# Patient Record
Sex: FEMALE | Race: ASIAN | NOT HISPANIC OR LATINO | Employment: FULL TIME | URBAN - METROPOLITAN AREA
[De-identification: names, ages, dates, MRNs, and addresses within clinical notes are randomized per-mention and may not be internally consistent; named-entity substitution may affect disease eponyms.]

---

## 2018-01-08 ENCOUNTER — OFFICE VISIT (OUTPATIENT)
Dept: INTERNAL MEDICINE | Facility: CLINIC | Age: 18
End: 2018-01-08

## 2018-01-08 VITALS
SYSTOLIC BLOOD PRESSURE: 108 MMHG | OXYGEN SATURATION: 98 % | HEIGHT: 66 IN | DIASTOLIC BLOOD PRESSURE: 66 MMHG | BODY MASS INDEX: 26.84 KG/M2 | WEIGHT: 167 LBS | HEART RATE: 95 BPM

## 2018-01-08 DIAGNOSIS — L30.9 ECZEMA, UNSPECIFIED TYPE: ICD-10-CM

## 2018-01-08 DIAGNOSIS — D50.8 OTHER IRON DEFICIENCY ANEMIA: ICD-10-CM

## 2018-01-08 DIAGNOSIS — Z00.00 ANNUAL PHYSICAL EXAM: Primary | ICD-10-CM

## 2018-01-08 PROCEDURE — 99385 PREV VISIT NEW AGE 18-39: CPT | Performed by: INTERNAL MEDICINE

## 2018-01-08 RX ORDER — MULTIPLE VITAMINS W/ MINERALS TAB 9MG-400MCG
1 TAB ORAL DAILY
COMMUNITY
End: 2018-09-05

## 2018-01-08 RX ORDER — FERROUS SULFATE 325(65) MG
325 TABLET ORAL
COMMUNITY

## 2018-01-08 RX ORDER — NORETHINDRONE ACETATE AND ETHINYL ESTRADIOL 1; 5 MG/1; UG/1
1 TABLET ORAL DAILY
COMMUNITY
End: 2018-01-29

## 2018-01-08 NOTE — PROGRESS NOTES
Chief Complaint   Patient presents with   • Establish Care       History of Present Illness  HM, Adult Female: The patient is being seen for a health maintenance and gynecology evaluation. The last health maintenance visit was 1 year(s) ago.   Social History: She is single. Work status:full time student.  She has never smoked. She reports never drinking alcohol. Denies any illicit drug use.  General Health: The patient's health is described as good. She has regular dental visits. She denies vision problems. She denies hearing loss. Immunizations status: up to date.   Lifestyle:. She consumes a diverse and healthy diet. She does not have any weight concerns. She exercises regularly. She denies tobacco. She denies alcohol use. She denies drug use.   Reproductive health:. She reports normal menses. On OBC due to menorrhagia for appx 3 years.  Screening: Cancer screening reviewed and current.   Metabolic screening reviewed and current.   Risk screening reviewed and current.   Has had some reflux, and eczema.  H/o menorrhagia, and had US.was wnl.  Went to  to have labs done-letter indicates, tsh was borderline high, and hemoglobin was wnl.    Review of Systems   Constitutional: Negative.  Negative for chills and fever.   HENT: Negative for ear discharge, ear pain, sinus pressure and sore throat.    Eyes: Negative for photophobia, pain and redness.   Respiratory: Negative for cough, chest tightness and shortness of breath.    Cardiovascular: Negative for chest pain, palpitations and leg swelling.   Gastrointestinal: Negative for diarrhea, nausea and vomiting.   Endocrine: Negative for polydipsia and polyphagia.   Genitourinary: Negative for frequency and urgency.   Musculoskeletal: Negative for arthralgias, back pain and myalgias.   Neurological: Negative for dizziness, syncope and headaches.   Psychiatric/Behavioral: Negative for confusion and sleep disturbance.       There is no problem list on file for this  "patient.      Social History     Social History   • Marital status: Single     Spouse name: N/A   • Number of children: N/A   • Years of education: N/A     Occupational History   • Not on file.     Social History Main Topics   • Smoking status: Never Smoker   • Smokeless tobacco: Never Used   • Alcohol use No   • Drug use: No   • Sexual activity: No     Other Topics Concern   • Not on file     Social History Narrative   • No narrative on file       No current outpatient prescriptions on file prior to visit.     No current facility-administered medications on file prior to visit.        No Known Allergies    /66  Pulse 95  Ht 167.6 cm (66\")  Wt 75.8 kg (167 lb)  SpO2 98%  BMI 26.95 kg/m2           The following portions of the patient's history were reviewed and updated as appropriate: allergies, current medications, past family history, past medical history, past social history, past surgical history and problem list.    Physical Exam   Constitutional: She is oriented to person, place, and time. She appears well-developed and well-nourished.   HENT:   Head: Normocephalic and atraumatic.   Right Ear: External ear normal.   Left Ear: External ear normal.   Mouth/Throat: No oropharyngeal exudate.   Eyes: Conjunctivae are normal. Pupils are equal, round, and reactive to light.   Neck: Neck supple. No thyromegaly present.   Cardiovascular: Normal rate, regular rhythm and intact distal pulses.    Pulmonary/Chest: Effort normal and breath sounds normal.   Abdominal: Soft. Bowel sounds are normal. She exhibits no distension. There is no tenderness.   Musculoskeletal: She exhibits no edema.   Neurological: She is alert and oriented to person, place, and time. No cranial nerve deficit.   Skin: Skin is warm and dry.   Psychiatric: She has a normal mood and affect. Judgment normal.   Nursing note and vitals reviewed.      Results for orders placed or performed in visit on 01/08/18   Comprehensive Metabolic Panel "   Result Value Ref Range    Glucose 86 70 - 100 mg/dL    BUN 10 9 - 23 mg/dL    Creatinine 0.60 0.60 - 1.30 mg/dL    Sodium 137 132 - 146 mmol/L    Potassium 4.6 3.5 - 5.5 mmol/L    Chloride 102 99 - 109 mmol/L    CO2 25.0 20.0 - 31.0 mmol/L    Calcium 9.5 8.7 - 10.4 mg/dL    Total Protein 6.7 5.7 - 8.2 g/dL    Albumin 4.00 3.20 - 4.80 g/dL    ALT (SGPT) 14 7 - 40 U/L    AST (SGOT) 18 0 - 33 U/L    Alkaline Phosphatase 88 25 - 100 U/L    Total Bilirubin 0.4 0.3 - 1.2 mg/dL    eGFR Non African Amer 129 >60 mL/min/1.73    eGFR  African Amer >150 >60 mL/min/1.73    Globulin 2.7 gm/dL    A/G Ratio 1.5 1.5 - 2.5 g/dL    BUN/Creatinine Ratio 16.7 7.0 - 25.0    Anion Gap 10.0 3.0 - 11.0 mmol/L   Lipid Panel   Result Value Ref Range    Total Cholesterol 175 0 - 200 mg/dL    Triglycerides 126 0 - 150 mg/dL    HDL Cholesterol 69 (H) 40 - 60 mg/dL    LDL Cholesterol  102 0 - 130 mg/dL   TSH   Result Value Ref Range    TSH 0.748 0.350 - 5.350 mIU/mL   T4, Free   Result Value Ref Range    Free T4 1.04 0.89 - 1.76 ng/dL   Vitamin D 25 Hydroxy   Result Value Ref Range    25 Hydroxy, Vitamin D 5.8 ng/ml       Vani was seen today for establish care.    Diagnoses and all orders for this visit:    Annual physical exam  -     Comprehensive Metabolic Panel  -     Lipid Panel  -     TSH  -     T4, Free  -     Vitamin D 25 Hydroxy    Other iron deficiency anemia    Eczema, unspecified type  Comments:  well controlled on OTC meds.          Health Maintenance   Topic Date Due   • TDAP/TD VACCINES (2 - Td) 11/01/2023   • INFLUENZA VACCINE  Completed       Discussion/Summary  Impression: health maintenance visit. Currently, she eats an adequate diet and has an adequate exercise regimen.   Cervical cancer screening:Pap smear is not indicated, start at 21.   Breast cancer screening: mammogram is not indicated.   Colorectal cancer screening: colonoscopy is not indicate.  Osteoporosis screening: Bone mineral density test is not indicate.    Screening lab work includes hemoglobin, glucose, lipid profile, thyroid function testing, 25-hydroxyvitamin D and urinalysis.   Immunizations are needed, immunizations will be given as outlined in the orders     Return in about 1 year (around 1/8/2019) for Annual physical.

## 2018-01-09 LAB
25(OH)D3 SERPL-MCNC: 5.8 NG/ML
ALBUMIN SERPL-MCNC: 4 G/DL (ref 3.2–4.8)
ALBUMIN/GLOB SERPL: 1.5 G/DL (ref 1.5–2.5)
ALP SERPL-CCNC: 88 U/L (ref 25–100)
ALT SERPL W P-5'-P-CCNC: 14 U/L (ref 7–40)
ANION GAP SERPL CALCULATED.3IONS-SCNC: 10 MMOL/L (ref 3–11)
ARTICHOKE IGE QN: 102 MG/DL (ref 0–130)
AST SERPL-CCNC: 18 U/L (ref 0–33)
BILIRUB SERPL-MCNC: 0.4 MG/DL (ref 0.3–1.2)
BUN BLD-MCNC: 10 MG/DL (ref 9–23)
BUN/CREAT SERPL: 16.7 (ref 7–25)
CALCIUM SPEC-SCNC: 9.5 MG/DL (ref 8.7–10.4)
CHLORIDE SERPL-SCNC: 102 MMOL/L (ref 99–109)
CHOLEST SERPL-MCNC: 175 MG/DL (ref 0–200)
CO2 SERPL-SCNC: 25 MMOL/L (ref 20–31)
CREAT BLD-MCNC: 0.6 MG/DL (ref 0.6–1.3)
GFR SERPL CREATININE-BSD FRML MDRD: 129 ML/MIN/1.73
GFR SERPL CREATININE-BSD FRML MDRD: >150 ML/MIN/1.73
GLOBULIN UR ELPH-MCNC: 2.7 GM/DL
GLUCOSE BLD-MCNC: 86 MG/DL (ref 70–100)
HDLC SERPL-MCNC: 69 MG/DL (ref 40–60)
POTASSIUM BLD-SCNC: 4.6 MMOL/L (ref 3.5–5.5)
PROT SERPL-MCNC: 6.7 G/DL (ref 5.7–8.2)
SODIUM BLD-SCNC: 137 MMOL/L (ref 132–146)
T4 FREE SERPL-MCNC: 1.04 NG/DL (ref 0.89–1.76)
TRIGL SERPL-MCNC: 126 MG/DL (ref 0–150)
TSH SERPL DL<=0.05 MIU/L-ACNC: 0.75 MIU/ML (ref 0.35–5.35)

## 2018-01-09 PROCEDURE — 84439 ASSAY OF FREE THYROXINE: CPT | Performed by: INTERNAL MEDICINE

## 2018-01-09 PROCEDURE — 84443 ASSAY THYROID STIM HORMONE: CPT | Performed by: INTERNAL MEDICINE

## 2018-01-09 PROCEDURE — 80053 COMPREHEN METABOLIC PANEL: CPT | Performed by: INTERNAL MEDICINE

## 2018-01-09 PROCEDURE — 80061 LIPID PANEL: CPT | Performed by: INTERNAL MEDICINE

## 2018-01-09 PROCEDURE — 82306 VITAMIN D 25 HYDROXY: CPT | Performed by: INTERNAL MEDICINE

## 2018-01-29 ENCOUNTER — OFFICE VISIT (OUTPATIENT)
Dept: INTERNAL MEDICINE | Facility: CLINIC | Age: 18
End: 2018-01-29

## 2018-01-29 VITALS
TEMPERATURE: 100.2 F | DIASTOLIC BLOOD PRESSURE: 68 MMHG | BODY MASS INDEX: 26.84 KG/M2 | HEART RATE: 127 BPM | HEIGHT: 66 IN | SYSTOLIC BLOOD PRESSURE: 132 MMHG | OXYGEN SATURATION: 99 % | WEIGHT: 167 LBS

## 2018-01-29 DIAGNOSIS — J11.1 FLU: Primary | ICD-10-CM

## 2018-01-29 DIAGNOSIS — J02.9 SORE THROAT: ICD-10-CM

## 2018-01-29 LAB
EXPIRATION DATE: ABNORMAL
EXPIRATION DATE: NORMAL
FLUAV AG NPH QL: NEGATIVE
FLUBV AG NPH QL: POSITIVE
INTERNAL CONTROL: ABNORMAL
INTERNAL CONTROL: NORMAL
Lab: ABNORMAL
Lab: NORMAL
S PYO AG THROAT QL: NEGATIVE

## 2018-01-29 PROCEDURE — 87804 INFLUENZA ASSAY W/OPTIC: CPT | Performed by: PHYSICIAN ASSISTANT

## 2018-01-29 PROCEDURE — 99213 OFFICE O/P EST LOW 20 MIN: CPT | Performed by: PHYSICIAN ASSISTANT

## 2018-01-29 PROCEDURE — 87880 STREP A ASSAY W/OPTIC: CPT | Performed by: PHYSICIAN ASSISTANT

## 2018-01-29 RX ORDER — NORGESTIMATE AND ETHINYL ESTRADIOL
KIT
COMMUNITY
Start: 2018-01-10

## 2018-01-29 RX ORDER — OSELTAMIVIR PHOSPHATE 75 MG/1
75 CAPSULE ORAL 2 TIMES DAILY
Qty: 10 CAPSULE | Refills: 1 | Status: SHIPPED | OUTPATIENT
Start: 2018-01-29 | End: 2018-09-05

## 2018-01-29 NOTE — PROGRESS NOTES
Subjective   Vani Eldridge is a 17 y.o. female  Sore Throat (Red, irritated, scratchy tonsils. Tea and cough drops help. ); Cough (Has been coughing to where getting nauseous ); Fever; and Generalized Body Aches      History of Present Illness  Patient is a pleasant 17-year-old female comes in complaining of body aches fever chills mild sore throat pressure both right and left ears cough which is nonproductive she's been taking over-the-counter Robitussin with some relief, patient's pain with swallowing mild sore throat no nausea no vomiting.  Rapid strep screen was negative rapid flu swab was positive for type B flu  The following portions of the patient's history were reviewed and updated as appropriate: allergies, current medications, past social history and problem list    Review of Systems   Constitutional: Positive for fatigue and fever. Negative for unexpected weight change.   Respiratory: Positive for cough and wheezing. Negative for chest tightness and shortness of breath.    Cardiovascular: Negative for chest pain, palpitations and leg swelling.   Gastrointestinal: Negative for nausea.   Skin: Negative for color change and rash.   Neurological: Negative for dizziness, syncope, weakness and headaches.       Objective     Vitals:    01/29/18 1807   BP: 132/68   Pulse: (!) 127   Temp: 100.2 °F (37.9 °C)   SpO2: 99%       Physical Exam   Constitutional: She appears well-developed and well-nourished.   HENT:   Head: Normocephalic and atraumatic.   Right Ear: Tympanic membrane and ear canal normal.   Left Ear: Tympanic membrane and ear canal normal.   Nose: Mucosal edema, rhinorrhea and sinus tenderness present. Right sinus exhibits maxillary sinus tenderness and frontal sinus tenderness. Left sinus exhibits maxillary sinus tenderness and frontal sinus tenderness.   Mouth/Throat: Oropharynx is clear and moist. No oropharyngeal exudate.   Eyes: Pupils are equal, round, and reactive to light.   Cardiovascular:  Normal rate and regular rhythm.    Pulmonary/Chest: Effort normal and breath sounds normal.   Nursing note and vitals reviewed.      Assessment/Plan     Diagnoses and all orders for this visit:    Flu  -     POCT Influenza A/B  -     oseltamivir (TAMIFLU) 75 MG capsule; Take 1 capsule by mouth 2 (Two) Times a Day.    Sore throat  -     POCT rapid strep A    Other orders  -     TRI-PREVIFEM 0.18/0.215/0.25 MG-35 MCG per tablet;     , Motrin for fever increase fluids

## 2018-02-05 ENCOUNTER — TELEPHONE (OUTPATIENT)
Dept: INTERNAL MEDICINE | Facility: CLINIC | Age: 18
End: 2018-02-05

## 2018-02-05 NOTE — TELEPHONE ENCOUNTER
Ok to extend for 4 more days.  Please print letter with release date for Next Monday.2/12/18    thanks

## 2018-02-05 NOTE — TELEPHONE ENCOUNTER
PATIENT'S FATHER IS REQUESTING AN EXTENDED WORK/SCHOOL EXCUSE FOR 4 MORE DAYS, HE REPORTS THAT THE PATIENT  IS STILL COUGHING. SHE WAS DX WITH FLU AT THIS OFFICE ON 01/29/2018    CALL BACK [phone]

## 2018-09-05 ENCOUNTER — OFFICE VISIT (OUTPATIENT)
Dept: INTERNAL MEDICINE | Facility: CLINIC | Age: 18
End: 2018-09-05

## 2018-09-05 VITALS
TEMPERATURE: 98.7 F | SYSTOLIC BLOOD PRESSURE: 126 MMHG | DIASTOLIC BLOOD PRESSURE: 78 MMHG | OXYGEN SATURATION: 99 % | WEIGHT: 160 LBS | HEIGHT: 66 IN | HEART RATE: 82 BPM | BODY MASS INDEX: 25.71 KG/M2

## 2018-09-05 DIAGNOSIS — J02.9 SORE THROAT: Primary | ICD-10-CM

## 2018-09-05 DIAGNOSIS — R52 BODY ACHES: ICD-10-CM

## 2018-09-05 LAB
EXPIRATION DATE: NORMAL
EXPIRATION DATE: NORMAL
FLUAV AG NPH QL: NEGATIVE
FLUBV AG NPH QL: NEGATIVE
INTERNAL CONTROL: NORMAL
INTERNAL CONTROL: NORMAL
Lab: NORMAL
Lab: NORMAL
S PYO AG THROAT QL: NEGATIVE

## 2018-09-05 PROCEDURE — 87804 INFLUENZA ASSAY W/OPTIC: CPT | Performed by: NURSE PRACTITIONER

## 2018-09-05 PROCEDURE — 87880 STREP A ASSAY W/OPTIC: CPT | Performed by: NURSE PRACTITIONER

## 2018-09-05 PROCEDURE — 99213 OFFICE O/P EST LOW 20 MIN: CPT | Performed by: NURSE PRACTITIONER

## 2018-09-05 RX ORDER — FLUTICASONE PROPIONATE 50 MCG
2 SPRAY, SUSPENSION (ML) NASAL DAILY
Qty: 1 BOTTLE | Refills: 1 | Status: SHIPPED | OUTPATIENT
Start: 2018-09-05 | End: 2018-12-17 | Stop reason: SDUPTHER

## 2018-09-06 NOTE — PATIENT INSTRUCTIONS
Strep and flu swabs were negative.  Continue Zyrtec.  Sitting and prescription for Flonase.Drink plenty of fluids.  Tylenol for pain.  Robitussin DM for cough unless otherwise directed.  Call on Monday if not steadily improving.  Sore Throat  When you have a sore throat, your throat may:  · Hurt.  · Burn.  · Feel irritated.  · Feel scratchy.    Many things can cause a sore throat, including:  · An infection.  · Allergies.  · Dryness in the air.  · Smoke or pollution.  · Gastroesophageal reflux disease (GERD).  · A tumor.    A sore throat can be the first sign of another sickness. It can happen with other problems, like coughing or a fever. Most sore throats go away without treatment.  Follow these instructions at home:  · Take over-the-counter medicines only as told by your doctor.  · Drink enough fluids to keep your pee (urine) clear or pale yellow.  · Rest when you feel you need to.  · To help with pain, try:  ? Sipping warm liquids, such as broth, herbal tea, or warm water.  ? Eating or drinking cold or frozen liquids, such as frozen ice pops.  ? Gargling with a salt-water mixture 3-4 times a day or as needed. To make a salt-water mixture, add ½-1 tsp of salt in 1 cup of warm water. Mix it until you cannot see the salt anymore.  ? Sucking on hard candy or throat lozenges.  ? Putting a cool-mist humidifier in your bedroom at night.  ? Sitting in the bathroom with the door closed for 5-10 minutes while you run hot water in the shower.  · Do not use any tobacco products, such as cigarettes, chewing tobacco, and e-cigarettes. If you need help quitting, ask your doctor.  Contact a doctor if:  · You have a fever for more than 2-3 days.  · You keep having symptoms for more than 2-3 days.  · Your throat does not get better in 7 days.  · You have a fever and your symptoms suddenly get worse.  Get help right away if:  · You have trouble breathing.  · You cannot swallow fluids, soft foods, or your saliva.  · You have  swelling in your throat or neck that gets worse.  · You keep feeling like you are going to throw up (vomit).  · You keep throwing up.  This information is not intended to replace advice given to you by your health care provider. Make sure you discuss any questions you have with your health care provider.  Document Released: 09/26/2009 Document Revised: 08/13/2017 Document Reviewed: 10/07/2016  Elsevier Interactive Patient Education © 2018 [employer]

## 2018-09-10 ENCOUNTER — TELEPHONE (OUTPATIENT)
Dept: INTERNAL MEDICINE | Facility: CLINIC | Age: 18
End: 2018-09-10

## 2018-09-10 DIAGNOSIS — J02.9 PHARYNGITIS, UNSPECIFIED ETIOLOGY: Primary | ICD-10-CM

## 2018-09-10 RX ORDER — AZITHROMYCIN 250 MG/1
TABLET, FILM COATED ORAL
Qty: 6 TABLET | Refills: 0 | Status: SHIPPED | OUTPATIENT
Start: 2018-09-10 | End: 2019-01-11

## 2018-09-10 RX ORDER — AMOXICILLIN 500 MG/1
500 CAPSULE ORAL 3 TIMES DAILY
Qty: 30 CAPSULE | Refills: 0 | Status: SHIPPED | OUTPATIENT
Start: 2018-09-10 | End: 2018-09-10 | Stop reason: ALTCHOICE

## 2018-09-10 NOTE — TELEPHONE ENCOUNTER
Please call pt  Why can't she can Amoxi?  It's not listed on her chart as an allergy.  I sent in a Zpack.

## 2018-09-10 NOTE — TELEPHONE ENCOUNTER
PATIENT SAID SHE WAS TOLD BY KARIN TO CALL BACK IF SHE WAS NOT FEELING BETTER SINCE LAST VISIT. SHE WAS SEEN ON 9/5 AND IS STILL NOT FEELING WELL. SHE WOULD LIKE SOMETHING CALLED IN IF POSSIBLE. SHE CANNOT TAKE AUGMENTIN OR AMOXICILLIN AND SHE WOULD PREFER A PROBIOTIC. PLEASE ADVISE. THANKS.

## 2018-10-29 RX ORDER — NORGESTIMATE AND ETHINYL ESTRADIOL
KIT
Qty: 28 TABLET | OUTPATIENT
Start: 2018-10-29

## 2018-12-17 DIAGNOSIS — J02.9 SORE THROAT: ICD-10-CM

## 2018-12-17 RX ORDER — FLUTICASONE PROPIONATE 50 MCG
SPRAY, SUSPENSION (ML) NASAL
Qty: 16 ML | Refills: 1 | Status: SHIPPED | OUTPATIENT
Start: 2018-12-17

## 2019-01-11 ENCOUNTER — OFFICE VISIT (OUTPATIENT)
Dept: INTERNAL MEDICINE | Facility: CLINIC | Age: 19
End: 2019-01-11

## 2019-01-11 VITALS
WEIGHT: 176.8 LBS | HEART RATE: 67 BPM | BODY MASS INDEX: 28.42 KG/M2 | OXYGEN SATURATION: 97 % | SYSTOLIC BLOOD PRESSURE: 100 MMHG | DIASTOLIC BLOOD PRESSURE: 60 MMHG | HEIGHT: 66 IN

## 2019-01-11 DIAGNOSIS — Z00.00 ROUTINE GENERAL MEDICAL EXAMINATION AT A HEALTH CARE FACILITY: Primary | ICD-10-CM

## 2019-01-11 DIAGNOSIS — J35.1 ENLARGED TONSILS: ICD-10-CM

## 2019-01-11 DIAGNOSIS — L85.3 XEROSIS CUTIS: ICD-10-CM

## 2019-01-11 LAB
ALBUMIN SERPL-MCNC: 4.19 G/DL (ref 3.2–4.8)
ALBUMIN/GLOB SERPL: 1.7 G/DL (ref 1.5–2.5)
ALP SERPL-CCNC: 112 U/L (ref 25–100)
ALT SERPL W P-5'-P-CCNC: 13 U/L (ref 7–40)
ANION GAP SERPL CALCULATED.3IONS-SCNC: 8 MMOL/L (ref 3–11)
ARTICHOKE IGE QN: 94 MG/DL (ref 0–130)
AST SERPL-CCNC: 18 U/L (ref 0–33)
BILIRUB BLD-MCNC: ABNORMAL MG/DL
BILIRUB SERPL-MCNC: 0.3 MG/DL (ref 0.3–1.2)
BUN BLD-MCNC: 11 MG/DL (ref 9–23)
BUN/CREAT SERPL: 17.7 (ref 7–25)
CALCIUM SPEC-SCNC: 9.3 MG/DL (ref 8.7–10.4)
CHLORIDE SERPL-SCNC: 101 MMOL/L (ref 99–109)
CHOLEST SERPL-MCNC: 162 MG/DL (ref 0–200)
CLARITY, POC: ABNORMAL
CO2 SERPL-SCNC: 26 MMOL/L (ref 20–31)
COLOR UR: ABNORMAL
CREAT BLD-MCNC: 0.62 MG/DL (ref 0.6–1.3)
DEPRECATED RDW RBC AUTO: 44 FL (ref 37–54)
ERYTHROCYTE [DISTWIDTH] IN BLOOD BY AUTOMATED COUNT: 14.9 % (ref 11.3–14.5)
GFR SERPL CREATININE-BSD FRML MDRD: 123 ML/MIN/1.73
GFR SERPL CREATININE-BSD FRML MDRD: 149 ML/MIN/1.73
GLOBULIN UR ELPH-MCNC: 2.4 GM/DL
GLUCOSE BLD-MCNC: 79 MG/DL (ref 70–100)
GLUCOSE UR STRIP-MCNC: NEGATIVE MG/DL
HCT VFR BLD AUTO: 36.5 % (ref 34.5–44)
HDLC SERPL-MCNC: 70 MG/DL (ref 40–60)
HGB BLD-MCNC: 11.6 G/DL (ref 11.5–15.5)
KETONES UR QL: NEGATIVE
LEUKOCYTE EST, POC: ABNORMAL
MCH RBC QN AUTO: 25.8 PG (ref 27–31)
MCHC RBC AUTO-ENTMCNC: 31.8 G/DL (ref 32–36)
MCV RBC AUTO: 81.1 FL (ref 80–99)
NITRITE UR-MCNC: NEGATIVE MG/ML
PH UR: 7 [PH] (ref 5–8)
PLATELET # BLD AUTO: 408 10*3/MM3 (ref 150–450)
PMV BLD AUTO: 9.8 FL (ref 6–12)
POTASSIUM BLD-SCNC: 5 MMOL/L (ref 3.5–5.5)
PROT SERPL-MCNC: 6.6 G/DL (ref 5.7–8.2)
PROT UR STRIP-MCNC: NEGATIVE MG/DL
RBC # BLD AUTO: 4.5 10*6/MM3 (ref 3.89–5.14)
RBC # UR STRIP: ABNORMAL /UL
SODIUM BLD-SCNC: 135 MMOL/L (ref 132–146)
SP GR UR: 1.01 (ref 1–1.03)
T4 FREE SERPL-MCNC: 1.05 NG/DL (ref 0.89–1.76)
TRIGL SERPL-MCNC: 91 MG/DL (ref 0–150)
TSH SERPL DL<=0.05 MIU/L-ACNC: 0.71 MIU/ML (ref 0.35–5.35)
UROBILINOGEN UR QL: ABNORMAL
WBC NRBC COR # BLD: 11.22 10*3/MM3 (ref 4.5–13.5)

## 2019-01-11 PROCEDURE — 81003 URINALYSIS AUTO W/O SCOPE: CPT | Performed by: INTERNAL MEDICINE

## 2019-01-11 PROCEDURE — 84439 ASSAY OF FREE THYROXINE: CPT | Performed by: INTERNAL MEDICINE

## 2019-01-11 PROCEDURE — 99395 PREV VISIT EST AGE 18-39: CPT | Performed by: INTERNAL MEDICINE

## 2019-01-11 PROCEDURE — 84443 ASSAY THYROID STIM HORMONE: CPT | Performed by: INTERNAL MEDICINE

## 2019-01-11 PROCEDURE — 85027 COMPLETE CBC AUTOMATED: CPT | Performed by: INTERNAL MEDICINE

## 2019-01-11 PROCEDURE — 80061 LIPID PANEL: CPT | Performed by: INTERNAL MEDICINE

## 2019-01-11 PROCEDURE — 80053 COMPREHEN METABOLIC PANEL: CPT | Performed by: INTERNAL MEDICINE

## 2019-01-11 PROCEDURE — 86376 MICROSOMAL ANTIBODY EACH: CPT | Performed by: INTERNAL MEDICINE

## 2019-01-11 NOTE — PROGRESS NOTES
Chief Complaint   Patient presents with   • Annual Exam       History of Present Illness  HM, Adult Female: The patient is being seen for a health maintenance and gynecology evaluation. The last health maintenance visit was 1 year(s) ago.   Social History: She is single. Work status:full time student.  She has never smoked. She reports never drinking alcohol. Denies any illicit drug use.  General Health: The patient's health is described as good. She has regular dental visits. She denies vision problems. She denies hearing loss. Immunizations status: up to date.   Lifestyle:. She consumes a diverse and healthy diet. She does not have any weight concerns. She exercises regularly. She denies tobacco. She denies alcohol use. She denies drug use.   Reproductive health:. She reports normal menses. On OBC due to menorrhagia for appx 3 years.  Screening: Cancer screening reviewed and current.   Metabolic screening reviewed and current.   Risk screening reviewed and current.     Unsure if she got her HPV . Mom thinks she may have had it during Middle School.  Eczema issues in the axilla.  Also asking if needs to change her her OBC as was told after 7 years.  Left foot fracture last Summer.   Seen by Dr. Giles. Healed well.    Review of Systems   Constitutional: Negative.  Negative for chills and fever.   HENT: Positive for postnasal drip. Negative for ear discharge, ear pain, sinus pressure and sore throat.    Eyes: Negative for redness and visual disturbance.   Respiratory: Negative for chest tightness and shortness of breath.    Cardiovascular: Negative for chest pain, palpitations and leg swelling.   Gastrointestinal: Negative for diarrhea, nausea and vomiting.   Genitourinary: Negative for dyspareunia and flank pain.   Musculoskeletal: Negative for arthralgias, back pain and myalgias.   Skin: Positive for rash (dry skin). Negative for pallor.   Neurological: Negative for dizziness, syncope and headaches.  "  Psychiatric/Behavioral: Negative for confusion and sleep disturbance.       There is no problem list on file for this patient.      Social History     Socioeconomic History   • Marital status: Single     Spouse name: Not on file   • Number of children: Not on file   • Years of education: Not on file   • Highest education level: Not on file   Social Needs   • Financial resource strain: Not on file   • Food insecurity - worry: Not on file   • Food insecurity - inability: Not on file   • Transportation needs - medical: Not on file   • Transportation needs - non-medical: Not on file   Occupational History   • Not on file   Tobacco Use   • Smoking status: Never Smoker   • Smokeless tobacco: Never Used   Substance and Sexual Activity   • Alcohol use: No   • Drug use: No   • Sexual activity: No     Partners: Male     Birth control/protection: OCP   Other Topics Concern   • Not on file   Social History Narrative   • Not on file       Current Outpatient Medications on File Prior to Visit   Medication Sig Dispense Refill   • fluticasone (FLONASE) 50 MCG/ACT nasal spray USE 2 SPRAYS INTO THE NOSTRIL(S) AS DIRECTED BY PROVIDER DAILY. 16 mL 1   • TRI-PREVIFEM 0.18/0.215/0.25 MG-35 MCG per tablet      • ferrous sulfate 325 (65 FE) MG tablet Take 325 mg by mouth Daily With Breakfast.       No current facility-administered medications on file prior to visit.        No Known Allergies    /60   Pulse 67   Ht 167.6 cm (66\")   Wt 80.2 kg (176 lb 12.8 oz)   LMP 01/02/2019 (Exact Date)   SpO2 97% Comment: ra  Breastfeeding? No   BMI 28.54 kg/m²            The following portions of the patient's history were reviewed and updated as appropriate: allergies, current medications, past family history, past medical history, past social history, past surgical history and problem list.    Physical Exam   Constitutional: She is oriented to person, place, and time. She appears well-developed and well-nourished.   HENT:   Head: " Normocephalic and atraumatic.   Right Ear: External ear normal.   Left Ear: External ear normal.   Mouth/Throat: No oropharyngeal exudate.   Eyes: Conjunctivae are normal. Pupils are equal, round, and reactive to light.   Neck: Neck supple. No thyromegaly present.   Cardiovascular: Normal rate, regular rhythm and intact distal pulses. Exam reveals no friction rub.   Pulmonary/Chest: Effort normal and breath sounds normal. She has no wheezes. She has no rales.   Abdominal: Soft. Bowel sounds are normal. She exhibits no distension and no mass. There is no tenderness. There is no rebound.   Musculoskeletal: She exhibits no edema.   Lymphadenopathy:     She has no cervical adenopathy.   Neurological: She is alert and oriented to person, place, and time. She displays normal reflexes. No cranial nerve deficit. She exhibits normal muscle tone.   Skin: Skin is warm and dry. Rash (mildly dry skin around axillary areas) noted.   Psychiatric: She has a normal mood and affect. Her behavior is normal. Judgment and thought content normal.   Nursing note and vitals reviewed.      Results for orders placed or performed in visit on 01/11/19   CBC (No Diff)   Result Value Ref Range    WBC 11.22 4.50 - 13.50 10*3/mm3    RBC 4.50 3.89 - 5.14 10*6/mm3    Hemoglobin 11.6 11.5 - 15.5 g/dL    Hematocrit 36.5 34.5 - 44.0 %    MCV 81.1 80.0 - 99.0 fL    MCH 25.8 (L) 27.0 - 31.0 pg    MCHC 31.8 (L) 32.0 - 36.0 g/dL    RDW 14.9 (H) 11.3 - 14.5 %    RDW-SD 44.0 37.0 - 54.0 fl    MPV 9.8 6.0 - 12.0 fL    Platelets 408 150 - 450 10*3/mm3   Comprehensive Metabolic Panel   Result Value Ref Range    Glucose 79 70 - 100 mg/dL    BUN 11 9 - 23 mg/dL    Creatinine 0.62 0.60 - 1.30 mg/dL    Sodium 135 132 - 146 mmol/L    Potassium 5.0 3.5 - 5.5 mmol/L    Chloride 101 99 - 109 mmol/L    CO2 26.0 20.0 - 31.0 mmol/L    Calcium 9.3 8.7 - 10.4 mg/dL    Total Protein 6.6 5.7 - 8.2 g/dL    Albumin 4.19 3.20 - 4.80 g/dL    ALT (SGPT) 13 7 - 40 U/L    AST (SGOT)  18 0 - 33 U/L    Alkaline Phosphatase 112 (H) 25 - 100 U/L    Total Bilirubin 0.3 0.3 - 1.2 mg/dL    eGFR Non African Amer 123 >60 mL/min/1.73    eGFR  African Amer 149 >60 mL/min/1.73    Globulin 2.4 gm/dL    A/G Ratio 1.7 1.5 - 2.5 g/dL    BUN/Creatinine Ratio 17.7 7.0 - 25.0    Anion Gap 8.0 3.0 - 11.0 mmol/L   TSH   Result Value Ref Range    TSH 0.711 0.350 - 5.350 mIU/mL   T4, Free   Result Value Ref Range    Free T4 1.05 0.89 - 1.76 ng/dL   Thyroid Peroxidase Antibody   Result Value Ref Range    Thyroid Peroxidase Antibody 10 0 - 34 IU/mL   Lipid Panel   Result Value Ref Range    Total Cholesterol 162 0 - 200 mg/dL    Triglycerides 91 0 - 150 mg/dL    HDL Cholesterol 70 (H) 40 - 60 mg/dL    LDL Cholesterol  94 0 - 130 mg/dL   POCT urinalysis dipstick, automated   Result Value Ref Range    Color Orange (A) Yellow, Straw, Dark Yellow, Elizabeth    Clarity, UA Cloudy (A) Clear    Specific Gravity  1.010 1.005 - 1.030    pH, Urine 7.0 5.0 - 8.0    Leukocytes 25 Seb/ul (A) Negative    Nitrite, UA Negative Negative    Protein, POC Negative Negative mg/dL    Glucose, UA Negative Negative, 1000 mg/dL (3+) mg/dL    Ketones, UA Negative Negative    Urobilinogen, UA 1 E.U./dL  (A) Normal    Bilirubin 1 mg/dL (A) Negative    Blood, UA 50 [place]/ul (A) Negative       Vani was seen today for annual exam.    Diagnoses and all orders for this visit:    Routine general medical examination at a health care facility  -     POCT urinalysis dipstick, automated  -     CBC (No Diff)  -     Comprehensive Metabolic Panel  -     TSH  -     T4, Free  -     Thyroid Peroxidase Antibody  -     Lipid Panel    Enlarged tonsils  -     Ambulatory Referral to ENT (Otolaryngology)    Xerosis cutis  Comments:  Adv. to use OTC Ceravae or Cetaphil for eczema.          Health Maintenance   Topic Date Due   • HPV VACCINES (1 - Female 3-dose series) 07/16/2009   • MENINGOCOCCAL VACCINE (Normal Risk) (1 - 2-dose series) 07/16/2014   • HEPATITIS A VACCINE  ADULT (1 of 2) 07/16/2016   • ANNUAL PHYSICAL  01/12/2020   • TDAP/TD VACCINES (2 - Td) 11/01/2023   • INFLUENZA VACCINE  Completed       Discussion/Summary  Impression: health maintenance visit. Currently, she eats an adequate diet and has an adequate exercise regimen.   Cervical cancer screening:Pap smear is due next year.   Breast cancer screening: mammogram is not indicated..   Colorectal cancer screening: colonoscopy is not indicated..  Osteoporosis screening: Bone mineral density test inot indicated. .   Screening lab work includes hemoglobin, glucose,  thyroid function testing, 25-hydroxyvitamin D and urinalysis.   Immunizations are needed, immunizations will be given as outlined in the orders due for HPV, due for 3 vaccine series.    Return in about 1 year (around 1/11/2020) for Annual with pap.

## 2019-01-12 LAB — THYROPEROXIDASE AB SERPL-ACNC: 10 IU/ML (ref 0–34)
